# Patient Record
Sex: FEMALE | Race: WHITE | NOT HISPANIC OR LATINO | URBAN - NONMETROPOLITAN AREA
[De-identification: names, ages, dates, MRNs, and addresses within clinical notes are randomized per-mention and may not be internally consistent; named-entity substitution may affect disease eponyms.]

---

## 2018-04-19 PROBLEM — H40.013: Noted: 2018-04-19

## 2018-04-19 PROBLEM — Z96.1: Noted: 2018-04-19

## 2018-04-19 PROBLEM — Z79.4: Noted: 2018-04-19

## 2018-04-19 PROBLEM — H35.413: Noted: 2018-04-19

## 2018-04-19 PROBLEM — E11.9: Noted: 2018-04-19

## 2018-04-19 PROBLEM — E11.3291: Noted: 2018-04-19

## 2019-04-23 ENCOUNTER — IMPORTED ENCOUNTER (OUTPATIENT)
Dept: URBAN - NONMETROPOLITAN AREA CLINIC 1 | Facility: CLINIC | Age: 64
End: 2019-04-23

## 2019-04-23 PROCEDURE — 92133 CPTRZD OPH DX IMG PST SGM ON: CPT

## 2019-04-23 PROCEDURE — 99213 OFFICE O/P EST LOW 20 MIN: CPT

## 2019-04-23 PROCEDURE — 92015 DETERMINE REFRACTIVE STATE: CPT

## 2019-04-23 NOTE — PATIENT DISCUSSION
POAGS-IOP 17 OU 04/23/19-VF SF 24-2 performed 10/16/18 reviewed w/pt 04/22/19 baseline stable-OCT ONH performed and reviewed w/pt stable-continue to monitorDM with Diabetic Retinopathy OD  without Diabetic Retinopathy OS-HgA1C 8.5 % checked in January-BS runs [id number] the importance of keeping blood sugars under control blood pressure under control and weight normalization and regular visits with PCP. -Explained the possible effects of poorly controlled diabetes and the damage that diabetes can cause to ocular health. -Patient to check HgbA1C.-Pt instructed to contact our office with any vision changes.-Order OCT MAC PC IOL OU-stable continue to monitorLattice OU-Retina flat 360 with no breaks tears or heme.-S&S of RD/RT reviewed with pt.-Stressed that pt should contact our office right away with any changes or increase in symptoms. RTC 6 Mo IOP check OCT MAC letter to [facility]

## 2019-10-15 ENCOUNTER — IMPORTED ENCOUNTER (OUTPATIENT)
Dept: URBAN - NONMETROPOLITAN AREA CLINIC 1 | Facility: CLINIC | Age: 64
End: 2019-10-15

## 2019-10-15 PROCEDURE — 99212 OFFICE O/P EST SF 10 MIN: CPT

## 2019-10-15 PROCEDURE — 92134 CPTRZ OPH DX IMG PST SGM RTA: CPT

## 2019-10-15 NOTE — PATIENT DISCUSSION
DM with Diabetic Retinopathy OD  without Diabetic Retinopathy OS-HgA1C 6.9% checked in January--Stressed the importance of keeping blood sugars under control blood pressure under control and weight normalization and regular visits with PCP. -Explained the possible effects of poorly controlled diabetes and the damage that diabetes can cause to ocular health. -Patient to check HgbA1C.-Pt instructed to contact our office with any vision changes. -OCT MAC performed and reviewed w/pt POAGS-IOP 17 OU 10/15/19-Order OCT ONH and VF -continue to [address] continue to monitorLattice OU-Retina flat 360 with no breaks tears or heme.-S&S of RD/RT reviewed with pt.-Stressed that pt should contact our office right away with any changes or increase in symptoms. RTC 6 Mo CEE OCT ONH and VF

## 2021-06-18 NOTE — PATIENT DISCUSSION
Advised surface of the eye appears to be irritated. Start AT's TID and gel at bedtime . Discussed risk of LASIK/PRK exacerbating dryness.

## 2021-06-18 NOTE — PATIENT DISCUSSION
Will get trial of contact lenses for patient prior to LASIK. Recommend bringing in SCL that was given at [address] [facility].

## 2021-07-12 NOTE — PATIENT DISCUSSION
Will get trial of contact lenses for patient prior to LASIK. Recommend bringing in SCL that was given at [address] [facility].

## 2021-07-19 NOTE — PATIENT DISCUSSION
Advised surface of the eye appears to be irritated. Discussed risk of LASIK/PRK exacerbating dryness.

## 2021-08-19 NOTE — PROCEDURE NOTE: CLINICAL
PROCEDURE NOTE: Punctal Plugs, Mary Alexander ([id number], [id number]) OU. Diagnosis: Keratoconjunctivitis Sicca, Not Specified As Sjögren's. Prior to treatment, the risks/benefits/alternatives were discussed. The patient wished to proceed with procedure. Verbal consent obtained. Temporary collagen plugs were inserted. Patient tolerated procedure well. There were no complications. Post procedure instructions given. Nirmal Stacy

## 2021-08-19 NOTE — PATIENT DISCUSSION
Recommend puncta plugs today BLL to try to alleviate symptoms. R/B/A's discussed, verbal consent obtained.

## 2021-09-22 NOTE — PATIENT DISCUSSION
Informed pt [address] will improve VA but it will not resolve her dry eye and it could exacerbate dryness.

## 2022-01-17 NOTE — PATIENT DISCUSSION
Advised recurrent PEE OU. Recommend restarting lotemax BID for 10 days, QHS for 10 days then [facility] for 10 days .

## 2022-01-17 NOTE — PATIENT DISCUSSION
Recommended continue PF artificial tears, gel drop and gel mary to use as directed. Any brand is okay to take as long as pt tolerates .

## 2022-01-17 NOTE — PATIENT DISCUSSION
Informed pt [address] will improve VA but it will not resolve her dry eye and it could exacerbate dryness. [address] not recommended due to MARVIN .

## 2022-02-22 NOTE — PATIENT DISCUSSION
Patient has been using AT'S . Pt states Tear duct is a problem , feels is too wet OS . Easton Navarro discussed with patient about condition for 13 Min and answered all questions.

## 2022-02-22 NOTE — PATIENT DISCUSSION
Start [place] . Samples provided and E-rx to [place]. Pt stated cost was $800 on insurance. Rx'd Orinda Ormond as above note.

## 2022-02-22 NOTE — PATIENT DISCUSSION
Monitor. Still significant Dryness OU. [place] discussed due to lasik. Recommend continue to use Lotemax once a day for 2weeks and then every other day for 2 weeks more.  Discussed trying Restasis, sending Rx to [facility] for Klarity C.

## 2022-02-22 NOTE — PATIENT DISCUSSION
Recommend to Follow up with Dr. Nely Willard regarding tearing OS.  Pt stated that she prefers BPEI with Dr. Mark Tillman and she will make her own appt.

## 2022-02-22 NOTE — PATIENT DISCUSSION
Discussed toric contact lenses vs [address] (not a good option with current MARVIN issues) vs glasses. Pt prefers Rx for glasses whcih was given today.

## 2022-04-09 ASSESSMENT — VISUAL ACUITY
OD_CC: 20/70
OD_PH: 20/30
OD_CC: 20/70-1
OS_CC: 20/20-1
OS_CC: 20/25-1

## 2022-04-09 ASSESSMENT — TONOMETRY
OD_IOP_MMHG: 17
OD_IOP_MMHG: 17
OS_IOP_MMHG: 17
OS_IOP_MMHG: 17

## 2022-05-23 NOTE — PATIENT DISCUSSION
Discussed toric contact lenses vs [address] (not a good option with current MARVIN issues) vs glasses.

## 2022-05-23 NOTE — PATIENT DISCUSSION
Recommend to Follow up with Dr. Zenaida Arnold regarding tearing OS.  Pt stated that she prefers BPEI with Dr. Omayra Gagnon and she will make her own appt.

## 2022-05-23 NOTE — PROCEDURE NOTE: CLINICAL
PROCEDURE NOTE: Punctal Plugs, Micha Marin ([id number], [id number]) #1 Bilateral Lower Lids. Diagnosis: Keratoconjunctivitis Sicca, Not Specified As Sjögren's. Prior to treatment, the risks/benefits/alternatives were discussed. The patient wished to proceed with procedure. A time out to confirm the patient, site, and procedure has been achieved. The site has been marked. Temporary collagen plugs were inserted. Patient tolerated procedure well. There were no complications. Post procedure instructions given. Sushil Nam

## 2023-09-14 ENCOUNTER — ESTABLISHED PATIENT (OUTPATIENT)
Dept: RURAL CLINIC 2 | Facility: CLINIC | Age: 68
End: 2023-09-14

## 2023-09-14 DIAGNOSIS — E11.9: ICD-10-CM

## 2023-09-14 DIAGNOSIS — H40.013: ICD-10-CM

## 2023-09-14 DIAGNOSIS — Z96.1: ICD-10-CM

## 2023-09-14 DIAGNOSIS — H04.213: ICD-10-CM

## 2023-09-14 PROCEDURE — 92133 CPTRZD OPH DX IMG PST SGM ON: CPT

## 2023-09-14 PROCEDURE — 76514 ECHO EXAM OF EYE THICKNESS: CPT

## 2023-09-14 PROCEDURE — 92014 COMPRE OPH EXAM EST PT 1/>: CPT

## 2023-09-14 ASSESSMENT — VISUAL ACUITY
OD_PH: 20/30
OS_SC: 20/25
OD_SC: 20/70

## 2023-09-14 ASSESSMENT — PACHYMETRY
OD_CT_UM: 548
OS_CT_UM: 572

## 2023-09-14 ASSESSMENT — TONOMETRY
OD_IOP_MMHG: 14
OS_IOP_MMHG: 14

## 2024-03-14 ENCOUNTER — ESTABLISHED PATIENT (OUTPATIENT)
Dept: RURAL CLINIC 2 | Facility: CLINIC | Age: 69
End: 2024-03-14

## 2024-03-14 DIAGNOSIS — Z96.1: ICD-10-CM

## 2024-03-14 DIAGNOSIS — H40.013: ICD-10-CM

## 2024-03-14 DIAGNOSIS — H04.213: ICD-10-CM

## 2024-03-14 DIAGNOSIS — E11.9: ICD-10-CM

## 2024-03-14 PROCEDURE — 99213 OFFICE O/P EST LOW 20 MIN: CPT

## 2024-03-14 PROCEDURE — 92083 EXTENDED VISUAL FIELD XM: CPT

## 2024-03-14 PROCEDURE — 92020 GONIOSCOPY: CPT

## 2024-03-14 ASSESSMENT — VISUAL ACUITY
OD_CC: 20/30+
OS_CC: 20/25+

## 2024-03-14 ASSESSMENT — TONOMETRY
OS_IOP_MMHG: 15
OD_IOP_MMHG: 17

## 2024-09-12 ENCOUNTER — COMPREHENSIVE EXAM (OUTPATIENT)
Dept: RURAL CLINIC 2 | Facility: CLINIC | Age: 69
End: 2024-09-12

## 2024-09-12 DIAGNOSIS — H40.013: ICD-10-CM

## 2024-09-12 DIAGNOSIS — H04.213: ICD-10-CM

## 2024-09-12 DIAGNOSIS — E11.9: ICD-10-CM

## 2024-09-12 DIAGNOSIS — Z79.4: ICD-10-CM

## 2024-09-12 DIAGNOSIS — Z96.1: ICD-10-CM

## 2024-09-12 PROCEDURE — 92133 CPTRZD OPH DX IMG PST SGM ON: CPT

## 2024-09-12 PROCEDURE — 92014 COMPRE OPH EXAM EST PT 1/>: CPT

## 2025-03-06 ENCOUNTER — DIAGNOSTICS ONLY (OUTPATIENT)
Age: 70
End: 2025-03-06

## 2025-03-06 DIAGNOSIS — H04.213: ICD-10-CM

## 2025-03-06 DIAGNOSIS — Z79.4: ICD-10-CM

## 2025-03-06 DIAGNOSIS — Z96.1: ICD-10-CM

## 2025-03-06 DIAGNOSIS — E11.9: ICD-10-CM

## 2025-03-06 DIAGNOSIS — H40.013: ICD-10-CM

## 2025-03-06 DIAGNOSIS — H52.4: ICD-10-CM

## 2025-03-06 PROCEDURE — 99214 OFFICE O/P EST MOD 30 MIN: CPT

## 2025-03-06 PROCEDURE — 92083 EXTENDED VISUAL FIELD XM: CPT

## 2025-08-27 ENCOUNTER — COMPREHENSIVE EXAM (OUTPATIENT)
Age: 70
End: 2025-08-27

## 2025-08-27 DIAGNOSIS — H04.213: ICD-10-CM

## 2025-08-27 DIAGNOSIS — H40.013: ICD-10-CM

## 2025-08-27 DIAGNOSIS — Z96.1: ICD-10-CM

## 2025-08-27 DIAGNOSIS — Z79.4: ICD-10-CM

## 2025-08-27 DIAGNOSIS — E11.9: ICD-10-CM

## 2025-08-27 PROCEDURE — 92133 CPTRZD OPH DX IMG PST SGM ON: CPT

## 2025-08-27 PROCEDURE — 92014 COMPRE OPH EXAM EST PT 1/>: CPT
